# Patient Record
Sex: FEMALE | Race: OTHER | HISPANIC OR LATINO
[De-identification: names, ages, dates, MRNs, and addresses within clinical notes are randomized per-mention and may not be internally consistent; named-entity substitution may affect disease eponyms.]

---

## 2018-02-06 PROBLEM — Z00.00 ENCOUNTER FOR PREVENTIVE HEALTH EXAMINATION: Status: ACTIVE | Noted: 2018-02-06

## 2018-02-07 ENCOUNTER — APPOINTMENT (OUTPATIENT)
Dept: OBGYN | Facility: CLINIC | Age: 48
End: 2018-02-07

## 2020-11-25 ENCOUNTER — APPOINTMENT (OUTPATIENT)
Dept: CARDIOLOGY | Facility: CLINIC | Age: 50
End: 2020-11-25
Payer: MEDICAID

## 2020-11-25 VITALS
HEIGHT: 64 IN | DIASTOLIC BLOOD PRESSURE: 70 MMHG | WEIGHT: 156 LBS | BODY MASS INDEX: 26.63 KG/M2 | SYSTOLIC BLOOD PRESSURE: 118 MMHG

## 2020-11-25 DIAGNOSIS — Z87.891 PERSONAL HISTORY OF NICOTINE DEPENDENCE: ICD-10-CM

## 2020-11-25 DIAGNOSIS — R07.9 CHEST PAIN, UNSPECIFIED: ICD-10-CM

## 2020-11-25 DIAGNOSIS — Z78.9 OTHER SPECIFIED HEALTH STATUS: ICD-10-CM

## 2020-11-25 PROCEDURE — 99072 ADDL SUPL MATRL&STAF TM PHE: CPT

## 2020-11-25 PROCEDURE — 93000 ELECTROCARDIOGRAM COMPLETE: CPT | Mod: 59

## 2020-11-25 PROCEDURE — 93015 CV STRESS TEST SUPVJ I&R: CPT

## 2020-11-25 PROCEDURE — 99202 OFFICE O/P NEW SF 15 MIN: CPT | Mod: 25

## 2020-11-25 NOTE — ASSESSMENT
[FreeTextEntry1] : Episode of chest pain, atypical for angina, now resolved.\par No evidence of ischemic heart disease.\par \par Plan:\par Diet, weight loss, add regular aerobic exercise.\par Complete and permanent smoking cessation.\par F/u with PMD to discuss recent blood work.\par \par En Lerma MD\par

## 2020-11-25 NOTE — HISTORY OF PRESENT ILLNESS
[FreeTextEntry1] : 50-yo female who experienced retrosternal chest pain for a few days, now resolved. The pain was exacerbated by certain movement, unchanged with other movement. Patient denies SOB.\par \par She has no h/o heart disease, HTN, DM.\par \par EST 11/25/20:\par 8.9 METs, normal HR and BP response to exercise, no ischemia.

## 2020-11-25 NOTE — REVIEW OF SYSTEMS
[see HPI] : see HPI [Negative] : Endocrine [Blurry Vision] : no blurred vision [Seeing Double (Diplopia)] : no diplopia [Lower Ext Edema] : no extremity edema [Leg Claudication] : no intermittent leg claudication [Palpitations] : no palpitations [Skin: A Rash] : no rash: [Anxiety] : no anxiety

## 2022-01-28 ENCOUNTER — APPOINTMENT (OUTPATIENT)
Dept: GYNECOLOGIC ONCOLOGY | Facility: CLINIC | Age: 52
End: 2022-01-28
Payer: MEDICAID

## 2022-01-28 VITALS
SYSTOLIC BLOOD PRESSURE: 123 MMHG | BODY MASS INDEX: 25.61 KG/M2 | TEMPERATURE: 97.5 F | DIASTOLIC BLOOD PRESSURE: 80 MMHG | WEIGHT: 150 LBS | HEIGHT: 64 IN

## 2022-01-28 DIAGNOSIS — N83.292 OTHER OVARIAN CYST, LEFT SIDE: ICD-10-CM

## 2022-01-28 PROCEDURE — 99204 OFFICE O/P NEW MOD 45 MIN: CPT

## 2022-01-28 NOTE — ASSESSMENT
[FreeTextEntry1] : 51 year old patient of Dr. Walker, with a history of C/S x2 now presents with a with a left ovarian cyst. Abnormal pap on 1/13/2022, atypical squamous cells of undetermined significance and  CA-125 10.6 on 1/13/2022. Patient presents today for further management. The patient is not interested in surgical management at this time.

## 2022-01-28 NOTE — DISCUSSION/SUMMARY
[FreeTextEntry1] : Options for surgical management discussed. Procedures offered patient included laparoscopic hysterectomy with bilateral salpingectomies along with possible bilateral oophorectomies versus clinical follow up with repeat sonogram along with ca-125 and pelvic exam. She is requesting to preserve her ovaries if normal and is opting for conservative Rx.\par \par The risk benefits and alternative to the recommended treatments were discussed. She was informed about potential complications of surgery including but not limited to bowel, bladder, and ureteral injuries. Infectious morbidity, along with bleeding and thromboembolic events were also discussed.  \par She showed clear understanding, was given the opportunity to ask questions and is amenable to conservative management.\par

## 2022-01-28 NOTE — OB HISTORY
[Total Preg ___] : : [unfilled] [Full Term ___] : [unfilled] (full-term) [Premature ___] : [unfilled] (premature) [Vaginal ___] : [unfilled] vaginal delivery(s) [ ___] : [unfilled]  section delivery(s) [AB Induced ___] : [unfilled] elective (s) [AB Spont ___] : [unfilled] miscarriage(s)

## 2022-01-28 NOTE — HISTORY OF PRESENT ILLNESS
[FreeTextEntry1] : 51 year old patient of Dr. Walker, with a left ovarian cyst, referred for consultation. In-office ultrasound performed on 01/13/2022 showed a complex cyst measuring 3.1 x 2.4 x 2.9 on left ovary. In addition, two subserosal fibroids found in fundus measuring 2.0 cm x 2.0 cm x 2.0 cm & 1.8 cm x 1.5 cm x 1.8 cm.  Abnormal pap on 1/13/2022, atypical squamous cells of undetermined significance and  CA-125 10.6 on 1/13/2022. Patient presents today for further management. She is scheduled for a "Tummy Tuck" feb 25 2022 and prefers conservative management of her adnexal mass along with her DUB.

## 2024-04-26 ENCOUNTER — APPOINTMENT (OUTPATIENT)
Dept: ORTHOPEDIC SURGERY | Facility: CLINIC | Age: 54
End: 2024-04-26

## 2024-05-02 ENCOUNTER — APPOINTMENT (OUTPATIENT)
Dept: ORTHOPEDIC SURGERY | Facility: CLINIC | Age: 54
End: 2024-05-02

## 2024-11-01 ENCOUNTER — APPOINTMENT (OUTPATIENT)
Dept: CARDIOLOGY | Facility: CLINIC | Age: 54
End: 2024-11-01

## 2024-11-01 VITALS
BODY MASS INDEX: 24.75 KG/M2 | HEIGHT: 64 IN | HEART RATE: 68 BPM | SYSTOLIC BLOOD PRESSURE: 116 MMHG | WEIGHT: 145 LBS | DIASTOLIC BLOOD PRESSURE: 80 MMHG

## 2024-11-01 DIAGNOSIS — E78.5 HYPERLIPIDEMIA, UNSPECIFIED: ICD-10-CM

## 2024-11-01 DIAGNOSIS — Z01.810 ENCOUNTER FOR PREPROCEDURAL CARDIOVASCULAR EXAMINATION: ICD-10-CM

## 2024-11-01 DIAGNOSIS — Z78.9 OTHER SPECIFIED HEALTH STATUS: ICD-10-CM

## 2024-11-01 DIAGNOSIS — R07.9 CHEST PAIN, UNSPECIFIED: ICD-10-CM

## 2024-11-01 PROCEDURE — 93015 CV STRESS TEST SUPVJ I&R: CPT

## 2024-11-01 PROCEDURE — 99204 OFFICE O/P NEW MOD 45 MIN: CPT | Mod: 25

## 2024-11-01 PROCEDURE — 93000 ELECTROCARDIOGRAM COMPLETE: CPT | Mod: 59
